# Patient Record
Sex: MALE | Race: WHITE | NOT HISPANIC OR LATINO | URBAN - NONMETROPOLITAN AREA
[De-identification: names, ages, dates, MRNs, and addresses within clinical notes are randomized per-mention and may not be internally consistent; named-entity substitution may affect disease eponyms.]

---

## 2018-01-11 NOTE — PROGRESS NOTES
Assessment    1  Encounter for preventive health examination (V70 0) (Z00 00)    Plan  Atypical chest pain    · (1) COMPREHENSIVE METABOLIC PANEL; Status:Active; Requested for:27Apr2016; Health Maintenance    · Begin a limited exercise program ; Status:Complete;   Done: 27Apr2016   · Brush your teeth 3 times a day and floss at least once a day ; Status:Complete;   Done:  27Apr2016   · Eat a low fat and low cholesterol diet ; Status:Complete;   Done: 27Apr2016   · Some eating tips that can help you lose weight ; Status:Complete;   Done: 27Apr2016   · There are ways to decrease your stress and improve your sense of well-being  We  encourage you to keep active and exercise regularly  Make time to take care of yourself  and participate in activities that you enjoy  Stay connected to friends and family that can  support and comfort you  If at any time you have thoughts of harming yourself or  someone else, contact us immediately ; Status:Active; Requested for:27Apr2016;    · Vitamins can help you get daily requirements that your diet may not be giving you ;  Status:Complete;   Done: 27Apr2016   · We encourage all of our patients to exercise regularly  30 minutes of exercise or physical  activity five or more days a week is recommended for children and adults ;  Status:Complete;   Done: 27Apr2016   · We encourage you to begin to make lifestyle changes to help control your blood  pressure  These may include losing weight, increasing your activity level, limiting salt in  your diet, decreasing alcohol intake, and eating a diet low in fat and rich in fruits  and vegetables ; Status:Complete;   Done: [id number]   · We recommend routine visits to a dentist ; Status:Complete;   Done: [id number]   · We recommend you modify your diet to achieve and maintain a healthy weight  Being  overweight may increase your risk for developing health problems such as diabetes,  heart disease, and cancer   Avoid high fat foods and eat a balanced diet rich  in fruits and vegetables  The combination of a reduced-calorie diet and increased  physical activity is recommended  Please let us know if you would like to  learn more about your nutrition and calorie needs, and additional options including  weight loss programs that can help you achieve your goals ; Status:Complete;   Done:  [id number]   · We recommend you modify your diet to achieve and maintain a healthy weight  Being  underweight may increase your risk of developing health problems from vitamin and  mineral deficiencies  We recommend a balanced diet rich in fruits and vegetables  You  may also consider increasing your calorie intake by eating more frequently or adding  nuts, avocados, and low-fat cheese or milk to your meals  Please let us know  if you would like to learn more about your nutrition and calorie needs, and additional  options to help you achieve your weight goals ; Status:Complete;   Done: [id number]   · Call [phone] if: You have any warning signs of skin cancer ; Status:Complete;    Done: [id number]    Chief Complaint  WELLNESS      History of Present Illness  HM, Adult Male: The patient is being seen for a health maintenance evaluation  The last health maintenance visit was 12 month(s) ago  Social History: Household members include spouse and 2 son(s)  He is   Work status: working full time  The patient has never smoked cigarettes  He reports rare alcohol use  The patient has no concerns about alcohol abuse  He has never used illicit drugs  General Health: The patient's health since the last visit is described as good  He denies vision problems  He denies hearing loss  Immunizations status: up to date  Lifestyle:  He consumes a diverse and healthy diet  He does not have any weight concerns  He exercises regularly  He does not use tobacco  He consumes alcohol  He denies drug use  Reproductive health:  the patient is sexually active   birth control is being practiced  He denies erectile dysfunction  Screening: cancer screening reviewed and current  metabolic screening reviewed and updated  Cardiovascular risk factors: no hypertension, no diabetes, no high LDL cholesterol, no low HDL cholesterol, no stress, no obesity, no tobacco use, no illicit drug use, no sedentary lifestyle and no family history of cardiovascular disease  General health risks: no elevated prostate-specific antigen, no undescended testis, no family history of prostate cancer, no previous colon polyps, no inflammatory bowel disease, no osteoporosis risk factors, no asbestos exposure, no radiation exposure and no occupational exposure to  Safety elements used: bicycle helmet, motorcycle helmet, safe driving habits, smoke detector and carbon monoxide detector, but no seat belt, no sunscreen, no hot water temperature less than 120 degrees F, no bathroom grab bars and no fall prevention measures  Risk findings: no passive smoke exposure, no unsafe sexual behavior, no chemical abuse, no domestic violence, no anxiety symptoms, no depression symptoms, no parenting concerns, no caregiver concerns, no stress management concerns, no anger management concerns, no unsafe driving habits, no multiple driving violations, no history of DUI / DWI, no memory loss, no falls risk, no osteoporosis risk factors, no occupational exposure, no travel to developing areas and no tuberculosis exposure  Review of Systems    Constitutional: No fever or chills, feels well, no tiredness, no recent weight gain or weight loss  Eyes: No complaints of eye pain, no red eyes, no discharge from eyes, no itchy eyes  ENT: no complaints of earache, no hearing loss, no nosebleeds, no nasal discharge, no sore throat, no hoarseness  Cardiovascular: No complaints of slow heart rate, no fast heart rate, no chest pain, no palpitations, no leg claudication, no lower extremity     Respiratory: No complaints of shortness of breath, no wheezing, no cough, no SOB on exertion, no orthopnea or PND  Gastrointestinal: No complaints of abdominal pain, no constipation, no nausea or vomiting, no diarrhea or bloody stools  Genitourinary: No complaints of dysuria, no incontinence, no hesitancy, no nocturia, no genital lesion, no testicular pain  Musculoskeletal: No complaints of arthralgia, no myalgias, no joint swelling or stiffness, no limb pain or swelling  Integumentary: No complaints of skin rash or skin lesions, no itching, no skin wound, no dry skin  Neurological: No compliants of headache, no confusion, no convulsions, no numbness or tingling, no dizziness or fainting, no limb weakness, no difficulty walking  Psychiatric: Is not suicidal, no sleep disturbances, no anxiety or depression, no change in personality, no emotional problems  Endocrine: No complaints of proptosis, no hot flashes, no muscle weakness, no erectile dysfunction, no deepening of the voice, no feelings of weakness  Hematologic/Lymphatic: No complaints of swollen glands, no swollen glands in the neck, does not bleed easily, no easy bruising  Active Problems    1  Atypical chest pain (786 59) (R07 89)   2  Encounter for examination for driving license (I17 4) (A34 4)    Past Medical History    · History of Cough (786 2) (R05)   · History of Denial Of Any Significant Medical History   · History of upper respiratory infection (V12 09) (Z87 09)    Surgical History    · History of Oral Surgery Tooth Extraction    Family History  Mother    · Family history of Diabetes Mellitus (V18 0)  Father    · Family history of Diabetes Mellitus (V18 0)  Brother    · Family history of Diabetes Mellitus (V18 0)    Social History    · Never A Smoker    Allergies    1   No Known Drug Allergies    Vitals   Recorded: 27Apr2016 10:39AM   Heart Rate 61   Systolic 974   Diastolic 76   Height 5 ft 10 in   Weight 156 lb    BMI Calculated 22 38   BSA Calculated 1 88   O2 Saturation 97     Physical Exam    Constitutional   General appearance: No acute distress, well appearing and well nourished  Ears, Nose, Mouth, and Throat   External inspection of ears and nose: Normal     Hearing: Normal     Nasal mucosa, septum, and turbinates: Normal without edema or erythema  Lips, teeth, and gums: Normal, good dentition  Oropharynx: Normal with no erythema, edema, exudate or lesions  Neck   Neck: Supple, symmetric, trachea midline, no masses  Thyroid: Normal, no thyromegaly  Pulmonary   Respiratory effort: No increased work of breathing or signs of respiratory distress  Auscultation of lungs: Clear to auscultation  Cardiovascular   Auscultation of heart: Normal rate and rhythm, normal S1 and S2, no murmurs  Carotid pulses: 2+ bilaterally  Abdominal aorta: Normal     Femoral pulses: 2+ bilaterally  Pedal pulses: 2+ bilaterally  Examination of extremities for edema and/or varicosities: Normal     Chest   Breasts: Normal, no dimpling or skin changes appreciated  Palpation of breasts and axillae: Normal, no masses palpated  Chest: Normal     Abdomen   Abdomen: Non-tender, no masses  Liver and spleen: No hepatomegaly or splenomegaly  Examination for hernias: No hernias appreciated  Anus, perineum, and rectum: Normal sphincter tone, no masses, no prolapse  Stool sample for occult blood: Negative  Genitourinary   Scrotal contents: Normal testes, no masses  Penis: Normal, no lesions  Digital rectal exam of prostate: Normal size, no masses  Lymphatic   Palpation of lymph nodes in neck: No lymphadenopathy  Palpation of lymph nodes in axillae: No lymphadenopathy  Palpation of lymph nodes in groin: No lymphadenopathy  Palpation of lymph nodes in other areas: No lymphadenopathy  Musculoskeletal   Gait and station: Normal     Inspection/palpation of digits and nails: Normal without clubbing or cyanosis      Inspection/palpation of joints, bones, and muscles: Normal     Range of motion: Normal     Stability: Normal     Muscle strength/tone: Normal     Skin   Skin and subcutaneous tissue: Normal without rashes or lesions  Palpation of skin and subcutaneous tissue: Normal turgor  Neurologic   Cranial nerves: Cranial nerves 2-12 intact  Reflexes: 2+ and symmetric  Sensation: No sensory loss  Psychiatric   Judgment and insight: Normal     Orientation to person, place and time: Normal     Recent and remote memory: Intact      Mood and affect: Normal        Signatures   Electronically signed by : Steff Cardenas MD; Apr 27 2016 12:29PM EST                       (Author)

## 2018-04-12 ENCOUNTER — TELEPHONE (OUTPATIENT)
Dept: FAMILY MEDICINE CLINIC | Facility: CLINIC | Age: 43
End: 2018-04-12

## 2018-04-12 DIAGNOSIS — J32.9 SINUSITIS, UNSPECIFIED CHRONICITY, UNSPECIFIED LOCATION: Primary | ICD-10-CM

## 2018-04-12 RX ORDER — AZITHROMYCIN 250 MG/1
TABLET, FILM COATED ORAL
Qty: 6 TABLET | Refills: 0 | Status: SHIPPED | OUTPATIENT
Start: 2018-04-12 | End: 2018-04-16

## 2018-04-12 RX ORDER — METHYLPREDNISOLONE 4 MG/1
TABLET ORAL
Qty: 21 TABLET | Refills: 0 | Status: SHIPPED | OUTPATIENT
Start: 2018-04-12

## 2022-09-13 ENCOUNTER — OFFICE VISIT (OUTPATIENT)
Dept: FAMILY MEDICINE CLINIC | Facility: CLINIC | Age: 47
End: 2022-09-13
Payer: COMMERCIAL

## 2022-09-13 VITALS — WEIGHT: 173 LBS | HEIGHT: 69 IN | BODY MASS INDEX: 25.62 KG/M2

## 2022-09-13 DIAGNOSIS — R63.5 WEIGHT GAIN: ICD-10-CM

## 2022-09-13 DIAGNOSIS — Z13.6 SCREENING FOR ISCHEMIC HEART DISEASE (IHD): ICD-10-CM

## 2022-09-13 DIAGNOSIS — R13.19 ESOPHAGEAL DYSPHAGIA: Primary | ICD-10-CM

## 2022-09-13 DIAGNOSIS — Z13.1 SCREENING FOR DIABETES MELLITUS (DM): ICD-10-CM

## 2022-09-13 PROCEDURE — 99214 OFFICE O/P EST MOD 30 MIN: CPT | Performed by: FAMILY MEDICINE

## 2022-09-13 RX ORDER — OMEPRAZOLE 40 MG/1
40 CAPSULE, DELAYED RELEASE ORAL DAILY
Qty: 90 CAPSULE | Refills: 3 | Status: SHIPPED | OUTPATIENT
Start: 2022-09-13

## 2022-09-13 NOTE — PROGRESS NOTES
Name: Manuelito Kern      : 1975      MRN: [id number]  Encounter Provider: Brice Macias MD  Encounter Date: 2022   Encounter department: [address] [place] [address]    Assessment & Plan     1  Esophageal dysphagia  -     omeprazole (PriLOSEC) 40 MG capsule; Take 1 capsule (40 mg total) by mouth daily  -     Ambulatory Referral to Gastroenterology; Future    2  Screening for ischemic heart disease (IHD)    3  Screening for diabetes mellitus (DM)  -     Lipid panel; Future  -     Comprehensive metabolic panel; Future    4  Weight gain  -     TSH, 3rd generation with Free T4 reflex; Future           Subjective      He's had solid food dysphagia for a few months  He has no weight loss  He has no F/C  He has no pain  The symptoms are intermittent  He has mild reflux symptoms  He denies black or tarry stools  He does not smoke  Review of Systems   All other systems reviewed and are negative  Current Outpatient Medications on File Prior to Visit   Medication Sig    [DISCONTINUED] Methylprednisolone 4 MG TBPK Use as directed on package (Patient not taking: Reported on 2022)       Objective     Ht 5' 9" (1 753 m)   Wt 78 5 kg (173 lb)   BMI 25 55 kg/m²     Physical Exam  Vitals and nursing note reviewed  Constitutional:       Appearance: Normal appearance  He is normal weight  Cardiovascular:      Rate and Rhythm: Normal rate and regular rhythm  Pulses: Normal pulses  Heart sounds: Normal heart sounds  Pulmonary:      Effort: Pulmonary effort is normal       Breath sounds: Normal breath sounds  Abdominal:      General: Abdomen is flat  Bowel sounds are normal       Palpations: Abdomen is soft  Musculoskeletal:         General: Normal range of motion  Cervical back: Normal range of motion and neck supple  Skin:     General: Skin is warm and dry  Capillary Refill: Capillary refill takes less than 2 seconds     Neurological:      General: No focal deficit present  Mental Status: He is alert and oriented to person, place, and time  Mental status is at baseline  Psychiatric:         Mood and Affect: Mood normal          Behavior: Behavior normal          Thought Content: Thought content normal          Judgment: Judgment normal        Caity Phillips MDBMUNRULY Counseling: Body mass index is 25 55 kg/m²  The BMI is above normal  Nutrition recommendations include reducing portion sizes, decreasing overall calorie intake, 3-5 servings of fruits/vegetables daily, reducing fast food intake, consuming healthier snacks, decreasing soda and/or juice intake, moderation in carbohydrate intake, increasing intake of lean protein, reducing intake of saturated fat and trans fat and reducing intake of cholesterol

## 2022-09-23 ENCOUNTER — APPOINTMENT (OUTPATIENT)
Dept: LAB | Facility: CLINIC | Age: 47
End: 2022-09-23
Payer: COMMERCIAL

## 2022-09-23 DIAGNOSIS — R63.5 WEIGHT GAIN: ICD-10-CM

## 2022-09-23 DIAGNOSIS — Z13.1 SCREENING FOR DIABETES MELLITUS (DM): ICD-10-CM

## 2022-09-23 PROCEDURE — 80053 COMPREHEN METABOLIC PANEL: CPT

## 2022-09-23 PROCEDURE — 84443 ASSAY THYROID STIM HORMONE: CPT

## 2022-09-23 PROCEDURE — 80061 LIPID PANEL: CPT

## 2022-09-23 PROCEDURE — 36415 COLL VENOUS BLD VENIPUNCTURE: CPT

## 2022-09-24 LAB
ALBUMIN SERPL BCP-MCNC: 4.1 G/DL (ref 3.5–5)
ALP SERPL-CCNC: 73 U/L (ref 46–116)
ALT SERPL W P-5'-P-CCNC: 50 U/L (ref 12–78)
ANION GAP SERPL CALCULATED.3IONS-SCNC: 5 MMOL/L (ref 4–13)
AST SERPL W P-5'-P-CCNC: 29 U/L (ref 5–45)
BILIRUB SERPL-MCNC: 0.64 MG/DL (ref 0.2–1)
BUN SERPL-MCNC: 14 MG/DL (ref 5–25)
CALCIUM SERPL-MCNC: 9.4 MG/DL (ref 8.3–10.1)
CHLORIDE SERPL-SCNC: 106 MMOL/L (ref 96–108)
CHOLEST SERPL-MCNC: 153 MG/DL
CO2 SERPL-SCNC: 27 MMOL/L (ref 21–32)
CREAT SERPL-MCNC: 1.11 MG/DL (ref 0.6–1.3)
GFR SERPL CREATININE-BSD FRML MDRD: 80 ML/MIN/1.73SQ M
GLUCOSE P FAST SERPL-MCNC: 81 MG/DL (ref 65–99)
HDLC SERPL-MCNC: 35 MG/DL
LDLC SERPL CALC-MCNC: 103 MG/DL (ref 0–100)
NONHDLC SERPL-MCNC: 118 MG/DL
POTASSIUM SERPL-SCNC: 4.6 MMOL/L (ref 3.5–5.3)
PROT SERPL-MCNC: 7.8 G/DL (ref 6.4–8.4)
SODIUM SERPL-SCNC: 138 MMOL/L (ref 135–147)
TRIGL SERPL-MCNC: 76 MG/DL
TSH SERPL DL<=0.05 MIU/L-ACNC: 1.27 UIU/ML (ref 0.45–4.5)

## 2024-06-19 ENCOUNTER — RA CDI HCC (OUTPATIENT)
Dept: OTHER | Facility: HOSPITAL | Age: 49
End: 2024-06-19

## 2024-06-25 ENCOUNTER — CONSULT (OUTPATIENT)
Dept: FAMILY MEDICINE CLINIC | Facility: CLINIC | Age: 49
End: 2024-06-25
Payer: COMMERCIAL

## 2024-06-25 VITALS
DIASTOLIC BLOOD PRESSURE: 76 MMHG | HEART RATE: 61 BPM | BODY MASS INDEX: 25.33 KG/M2 | OXYGEN SATURATION: 98 % | RESPIRATION RATE: 18 BRPM | SYSTOLIC BLOOD PRESSURE: 106 MMHG | WEIGHT: 171 LBS | HEIGHT: 69 IN

## 2024-06-25 DIAGNOSIS — H26.103: Primary | ICD-10-CM

## 2024-06-25 PROCEDURE — 99213 OFFICE O/P EST LOW 20 MIN: CPT

## 2024-06-25 NOTE — PROGRESS NOTES
Presurgical Evaluation    Subjective:      Patient ID: Curtis Nesbitt is a 48 y.o. male.    Chief Complaint   Patient presents with    Pre-op Exam     Patient will have cataract surgery scheduled for July 8th 2024 and July 9th, 2024. Needs forms filled out.        Mr Nesbitt is a 48M w no significant PMH, here for medical clearance for cataract surgery, details per note. Otherwise in his usual state of health, with no other acute complains at this time. Patient has prior history of trauma to the eyes, and has been dealing with worsening vision changes over the past 18 years, now finding that it's restricting his daily living. Patient is medically cleared for proposed procedure, at low cardiovascular event risk.         The following portions of the patient's history were reviewed and updated as appropriate: allergies, current medications, past family history, past medical history, past social history, past surgical history, and problem list.    Procedure date: 6/11,6/25    Surgeon:  Dr Ray Razo   Planned procedure:  Cataract surgery   Diagnosis for procedure:  Bilateral Cataracts    Prior anesthesia: No    CAD History: None   NOTE: Patient should see Cardiology if time available before surgery, and if appropriate.    Pulmonary History: None    Renal history: None    Diabetes History:  None     Neurological History: None     On Immunosuppressant meds/biologics: No      Review of Systems   Constitutional:  Negative for activity change, chills, fatigue and fever.   HENT:  Negative for congestion, ear pain, postnasal drip, rhinorrhea and sore throat.    Eyes:  Positive for visual disturbance. Negative for pain.   Respiratory:  Negative for cough and shortness of breath.    Cardiovascular:  Negative for chest pain and palpitations.   Gastrointestinal:  Negative for abdominal pain, constipation, diarrhea, nausea and vomiting.   Genitourinary:  Negative for dysuria and hematuria.   Musculoskeletal:  Negative for  "arthralgias and back pain.   Skin:  Negative for color change and rash.   Neurological:  Positive for headaches. Negative for dizziness, seizures, syncope, weakness and numbness.   Psychiatric/Behavioral:  Negative for agitation, confusion and sleep disturbance. The patient is not nervous/anxious.    All other systems reviewed and are negative.        Current Outpatient Medications   Medication Sig Dispense Refill    omeprazole (PriLOSEC) 40 MG capsule Take 1 capsule (40 mg total) by mouth daily (Patient taking differently: Take 40 mg by mouth daily As needed) 90 capsule 3     No current facility-administered medications for this visit.       Allergies on file:   Patient has no known allergies.    There is no problem list on file for this patient.       History reviewed. No pertinent past medical history.    History reviewed. No pertinent surgical history.    History reviewed. No pertinent family history.    Social History     Tobacco Use    Smoking status: Never    Smokeless tobacco: Never   Vaping Use    Vaping status: Never Used   Substance Use Topics    Alcohol use: Yes     Comment: Rare    Drug use: Never       Objective:    Vitals:    06/25/24 0849   BP: 106/76   BP Location: Left arm   Patient Position: Sitting   Cuff Size: Standard   Pulse: 61   Resp: 18   SpO2: 98%   Weight: 77.6 kg (171 lb)   Height: 5' 9\" (1.753 m)        Physical Exam  Vitals and nursing note reviewed.   Constitutional:       General: He is not in acute distress.     Appearance: Normal appearance. He is normal weight. He is not ill-appearing.   HENT:      Head: Normocephalic and atraumatic.      Right Ear: External ear normal.      Left Ear: External ear normal.      Nose: Nose normal.      Mouth/Throat:      Mouth: Mucous membranes are moist.      Pharynx: Oropharynx is clear.   Eyes:      Extraocular Movements: Extraocular movements intact.      Conjunctiva/sclera: Conjunctivae normal.   Cardiovascular:      Rate and Rhythm: Normal " rate and regular rhythm.      Pulses: Normal pulses.      Heart sounds: Normal heart sounds. No murmur heard.  Pulmonary:      Effort: Pulmonary effort is normal.      Breath sounds: Normal breath sounds. No wheezing.   Abdominal:      General: Abdomen is flat. Bowel sounds are normal.      Palpations: Abdomen is soft.      Tenderness: There is no abdominal tenderness. There is no guarding.   Musculoskeletal:         General: No swelling or signs of injury. Normal range of motion.      Cervical back: Normal range of motion and neck supple. No rigidity.   Skin:     General: Skin is warm and dry.      Findings: No bruising, erythema or rash.   Neurological:      General: No focal deficit present.      Mental Status: He is alert and oriented to person, place, and time.   Psychiatric:         Mood and Affect: Mood normal.         Behavior: Behavior normal.           Preop labs/testing available and reviewed: yes               EKG no    Echo no    Stress test/cath no    PFT/[place] no    Functional capacity: Singles tennis                       7-12 Mets   Pick the highest level patient can comfortably perform   4 mets or greater for surgery    RCRI  High Risk surgery?         1 Point  CAD History:         1 Point   MI; Positive Stress Test; CP due to Mi;  Nitrate Usage to control Angina; Pathologic Q wave on EKG  CHF Active:         1 Point   Pulm Edema; Paroxysmal Nocturnal Dyspnea;  Bibasilar Rales (crackles);S3; CHF on CXR  Cerebrovascular Disease (TIA or CVA):     1 Point  DM on Insulin:        1 Point  Serum Creat >2.0 mg/dl:       1 Point          Total Points: 0     Scorin: Class I, Very Low Risk (0.4%)     1: Class II, Low risk (0.9%)     2: Class III Moderate (6.6%)     3: Class IV High (>11%)      BONIFACIO Risk:  GFR:        For PCP:  If GFR>60, Hold ACE/ARB/Diuretic on the day of surgery, and NSAIDS 10 days before.    If GFR<45, Consider PRE and POST op Nephrology Consult.    If 46 <GFR> 59 : Has Patient had BONIFACIO  "in last 6 Months? no   If YES: Preop Nephrology consult   If No:  Post Op Nephrology consult.           Assessment/Plan:    Patient is medically optimized (cleared) for the planned procedure.    Further testing/evaluation is not required.    Postop concerns: no    Problem List Items Addressed This Visit    None  Visit Diagnoses       Traumatic cataract of both eyes, unspecified traumatic cataract type    -  Primary             Diagnoses and all orders for this visit:    Traumatic cataract of both eyes, unspecified traumatic cataract type          Pre-Surgery Instructions:   Medication Instructions    omeprazole (PriLOSEC) 40 MG capsule per anesthesia guidelines         NOTE: Please use the above to review important meds for your specialty, the remainder \"per anesthesia Guidelines.\"    NOTE: Please place an Inbasket message for \"SOC\" pool for complicated patients.      "

## 2024-06-25 NOTE — PROGRESS NOTES
"Assessment & Plan     There are no diagnoses linked to this encounter.    Subjective     Patient Id: Curtis Nesbitt is a 48 y.o. male    HPI    Review of Systems    History reviewed. No pertinent past medical history.    History reviewed. No pertinent surgical history.    History reviewed. No pertinent family history.    Social History     Socioeconomic History    Marital status: /Civil Union     Spouse name: None    Number of children: None    Years of education: None    Highest education level: None   Occupational History    None   Tobacco Use    Smoking status: Never    Smokeless tobacco: Never   Vaping Use    Vaping status: Never Used   Substance and Sexual Activity    Alcohol use: Yes     Comment: Rare    Drug use: Never    Sexual activity: Not Currently   Other Topics Concern    None   Social History Narrative    None     Social Determinants of Health     Financial Resource Strain: Not on file   Food Insecurity: Not on file   Transportation Needs: Not on file   Physical Activity: Not on file   Stress: Not on file   Social Connections: Not on file   Intimate Partner Violence: Not on file   Housing Stability: Not on file       No Known Allergies      Current Outpatient Medications:     omeprazole (PriLOSEC) 40 MG capsule, Take 1 capsule (40 mg total) by mouth daily (Patient taking differently: Take 40 mg by mouth daily As needed), Disp: 90 capsule, Rfl: 3    Objective     Vitals:    06/25/24 0849   BP: 106/76   BP Location: Left arm   Patient Position: Sitting   Cuff Size: Standard   Pulse: 61   Resp: 18   SpO2: 98%   Weight: 77.6 kg (171 lb)   Height: 5' 9\" (1.753 m)       Physical Exam    Carolyne Munson MD  Family Medicine  [facility]  "

## 2024-06-25 NOTE — PROGRESS NOTES
Presurgical Evaluation    Subjective:      Patient ID: Curtis Nesbitt is a 48 y.o. male.    Chief Complaint   Patient presents with    Pre-op Exam     Patient will have cataract surgery scheduled for July 8th 2024 and July 9th, 2024. Needs forms filled out.        HPI  6/11 and 25     The following portions of the patient's history were reviewed and updated as appropriate: allergies, current medications, past family history, past medical history, past social history, past surgical history, and problem list.    Procedure date: {DATE:23014}    Surgeon:  ***  Planned procedure:  ***  Diagnosis for procedure:  ***    Prior anesthesia: {[facility] ANESTHSIA :0503698203}    [facility]   NOTE: Patient should see Cardiology if time available before surgery, and if appropriate.    Pulmonary History: {[facility] PULM HX:6826555478}    Renal history: {[facility] Renal Hx:7945260453}    Diabetes History:  {[facility] DIABETES TYPE:8228248221}     Neurological History: {[facility] neuro:4419879700}     On Immunosuppressant meds/biologics: {[facility] IMMUNOSUPRESSANTS:1969216222}      Review of Systems      Current Outpatient Medications   Medication Sig Dispense Refill    omeprazole (PriLOSEC) 40 MG capsule Take 1 capsule (40 mg total) by mouth daily (Patient taking differently: Take 40 mg by mouth daily As needed) 90 capsule 3     No current facility-administered medications for this visit.       Allergies on file:   Patient has no known allergies.    There is no problem list on file for this patient.       History reviewed. No pertinent past medical history.    History reviewed. No pertinent surgical history.    History reviewed. No pertinent family history.    Social History     Tobacco Use    Smoking status: Never    Smokeless tobacco: Never   Vaping Use    Vaping status: Never Used   Substance Use Topics    Alcohol use: Yes     Comment: Rare    Drug use: Never       Objective:    Vitals:    06/25/24 0849   BP: 106/76   BP  "Location: Left arm   Patient Position: Sitting   Cuff Size: Standard   Pulse: 61   Resp: 18   SpO2: 98%   Weight: 77.6 kg (171 lb)   Height: 5' 9\" (1.753 m)        Physical Exam      Preop labs/testing available and reviewed: {YES/NO:}               EKG {YES/NO:}    Echo {YES/NO:}    Stress test/cath {YES/NO:}    PFT/[place] {YES/NO:}    Functional capacity: {sl amb metabolic equivalents:5744936274}   Pick the highest level patient can comfortably perform   4 mets or greater for surgery    RCRI  High Risk surgery?         1 Point  CAD History:         1 Point   MI; Positive Stress Test; CP due to Mi;  Nitrate Usage to control Angina; Pathologic Q wave on EKG  CHF Active:         1 Point   Pulm Edema; Paroxysmal Nocturnal Dyspnea;  Bibasilar Rales (crackles);S3; CHF on CXR  Cerebrovascular Disease (TIA or CVA):     1 Point  DM on Insulin:        1 Point  Serum Creat >2.0 mg/dl:       1 Point          Total Points: {Numbers; 0-6:27731}     Scorin: Class I, Very Low Risk (0.4%)     1: Class II, Low risk (0.9%)     2: Class III Moderate (6.6%)     3: Class IV High (>11%)      BONIFACIO Risk:  GFR:        For PCP:  If GFR>60, Hold ACE/ARB/Diuretic on the day of surgery, and NSAIDS 10 days before.    If GFR<45, Consider PRE and POST op Nephrology Consult.    If 46 <GFR> 59 : Has Patient had BONIFACIO in last 6 Months? {YES/NO:}   If YES: Preop Nephrology consult   If No:  Post Op Nephrology consult.           Assessment/Plan:    Patient {is/is not:39356} medically optimized (cleared) for the planned procedure.    Further testing/evaluation {is/is not:61905} required.    Postop concerns: {YES/NO:}    Problem List Items Addressed This Visit    None       {Assess/PlanSmartLinks:09305}      Pre-Surgery Instructions:   Medication Instructions    omeprazole (PriLOSEC) 40 MG capsule {OR PAT MED INSTRUCTIONS:87296}        NOTE: Please use the above to review important meds for your specialty, the " "remainder \"per anesthesia Guidelines.\"    NOTE: Please place an Inbasket message for \"SOC\" pool for complicated patients.    "

## 2024-10-08 ENCOUNTER — OFFICE VISIT (OUTPATIENT)
Dept: FAMILY MEDICINE CLINIC | Facility: CLINIC | Age: 49
End: 2024-10-08
Payer: COMMERCIAL

## 2024-10-08 VITALS
OXYGEN SATURATION: 99 % | SYSTOLIC BLOOD PRESSURE: 108 MMHG | HEIGHT: 69 IN | RESPIRATION RATE: 18 BRPM | DIASTOLIC BLOOD PRESSURE: 64 MMHG | BODY MASS INDEX: 25.77 KG/M2 | HEART RATE: 65 BPM | WEIGHT: 174 LBS

## 2024-10-08 DIAGNOSIS — R13.19 ESOPHAGEAL DYSPHAGIA: ICD-10-CM

## 2024-10-08 DIAGNOSIS — Z00.00 ANNUAL PHYSICAL EXAM: Primary | ICD-10-CM

## 2024-10-08 PROCEDURE — 99396 PREV VISIT EST AGE 40-64: CPT

## 2024-10-08 RX ORDER — OMEPRAZOLE 40 MG/1
40 CAPSULE, DELAYED RELEASE ORAL DAILY
Qty: 30 CAPSULE | Refills: 2 | Status: SHIPPED | OUTPATIENT
Start: 2024-10-08

## 2024-10-08 NOTE — PATIENT INSTRUCTIONS
"Patient Education     Routine physical for adults   The Basics   Written by the doctors and editors at [facility]   What is a physical? -- A physical is a routine visit, or \"check-up,\" with your doctor. You might also hear it called a \"wellness visit\" or \"preventive visit.\"  During each visit, the doctor will:   Ask about your physical and mental health   Ask about your habits, behaviors, and lifestyle   Do an exam   Give you vaccines if needed   Talk to you about any medicines you take   Give advice about your health   Answer your questions  Getting regular check-ups is an important part of taking care of your health. It can help your doctor find and treat any problems you have. But it's also important for preventing health problems.  A routine physical is different from a \"sick visit.\" A sick visit is when you see a doctor because of a health concern or problem. Since physicals are scheduled ahead of time, you can think about what you want to ask the doctor.  How often should I get a physical? -- It depends on your age and health. In general, for people age 21 years and older:   If you are younger than 50 years, you might be able to get a physical every 3 years.   If you are 50 years or older, your doctor might recommend a physical every year.  If you have an ongoing health condition, like diabetes or high blood pressure, your doctor will probably want to see you more often.  What happens during a physical? -- In general, each visit will include:   Physical exam - The doctor or nurse will check your height, weight, heart rate, and blood pressure. They will also look at your eyes and ears. They will ask about how you are feeling and whether you have any symptoms that bother you.   Medicines - It's a good idea to bring a list of all the medicines you take to each doctor visit. Your doctor will talk to you about your medicines and answer any questions. Tell them if you are having any side effects that bother you. You " "should also tell them if you are having trouble paying for any of your medicines.   Habits and behaviors - This includes:   Your diet   Your exercise habits   Whether you smoke, drink alcohol, or use drugs   Whether you are sexually active   Whether you feel safe at home  Your doctor will talk to you about things you can do to improve your health and lower your risk of health problems. They will also offer help and support. For example, if you want to quit smoking, they can give you advice and might prescribe medicines. If you want to improve your diet or get more physical activity, they can help you with this, too.   Lab tests, if needed - The tests you get will depend on your age and situation. For example, your doctor might want to check your:   Cholesterol   Blood sugar   Iron level   Vaccines - The recommended vaccines will depend on your age, health, and what vaccines you already had. Vaccines are very important because they can prevent certain serious or deadly infections.   Discussion of screening - \"Screening\" means checking for diseases or other health problems before they cause symptoms. Your doctor can recommend screening based on your age, risk, and preferences. This might include tests to check for:   Cancer, such as breast, prostate, cervical, ovarian, colorectal, prostate, lung, or skin cancer   Sexually transmitted infections, such as chlamydia and gonorrhea   Mental health conditions like depression and anxiety  Your doctor will talk to you about the different types of screening tests. They can help you decide which screenings to have. They can also explain what the results might mean.   Answering questions - The physical is a good time to ask the doctor or nurse questions about your health. If needed, they can refer you to other doctors or specialists, too.  Adults older than 65 years often need other care, too. As you get older, your doctor will talk to you about:   How to prevent falling at " home   Hearing or vision tests   Memory testing   How to take your medicines safely   Making sure that you have the help and support you need at home  All topics are updated as new evidence becomes available and our peer review process is complete.  This topic retrieved from [employer] on: May 02, 2024.  Topic 619499 Version 1.0  Release: 32.4.3 - C32.122  © 2024 UpToDate, Inc. and/or its affiliates. All rights reserved.  Consumer Information Use and Disclaimer   Disclaimer: This generalized information is a limited summary of diagnosis, treatment, and/or medication information. It is not meant to be comprehensive and should be used as a tool to help the user understand and/or assess potential diagnostic and treatment options. It does NOT include all information about conditions, treatments, medications, side effects, or risks that may apply to a specific patient. It is not intended to be medical advice or a substitute for the medical advice, diagnosis, or treatment of a health care provider based on the health care provider's examination and assessment of a patient's specific and unique circumstances. Patients must speak with a health care provider for complete information about their health, medical questions, and treatment options, including any risks or benefits regarding use of medications. This information does not endorse any treatments or medications as safe, effective, or approved for treating a specific patient. UpToDate, Inc. and its affiliates disclaim any warranty or liability relating to this information or the use thereof.The use of this information is governed by the Terms of Use, available at https://www.woltersPlayRavenuwer.com/en/know/clinical-effectiveness-terms. 2024© UpToDate, Inc. and its affiliates and/or licensors. All rights reserved.  Copyright   © 2024 UpToDate, Inc. and/or its affiliates. All rights reserved.

## 2024-10-08 NOTE — PROGRESS NOTES
Adult Annual Physical  Name: Curtis Nesbitt      : 1975      MRN: [id number]  Encounter Provider: Carolyne Munson MD  Encounter Date: 10/8/2024   Encounter department: [facility]    Assessment & Plan  Annual physical exam         Esophageal dysphagia    Orders:    omeprazole (PriLOSEC) 40 MG capsule; Take 1 capsule (40 mg total) by mouth daily    BMI 25.0-25.9,adult         Immunizations and preventive care screenings were discussed with patient today. Appropriate education was printed on patient's after visit summary.    Discussed risks and benefits of prostate cancer screening. We discussed the controversial history of PSA screening for prostate cancer in the [place] as well as the risk of over detection and over treatment of prostate cancer by way of PSA screening.  The patient understands that PSA blood testing is an imperfect way to screen for prostate cancer and that elevated PSA levels in the blood may also be caused by infection, inflammation, prostatic trauma or manipulation, urological procedures, or by benign prostatic enlargement.    The role of the digital rectal examination in prostate cancer screening was also discussed and I discussed with him that there is large interobserver variability in the findings of digital rectal examination.    Counseling:  Dental Health: discussed importance of regular tooth brushing, flossing, and dental visits.  Exercise: the importance of regular exercise/physical activity was discussed. Recommend exercise 3-5 times per week for at least 30 minutes.          History of Present Illness     Adult Annual Physical:  Patient presents for annual physical.     Diet and Physical Activity:  - Diet/Nutrition: poor diet and limited junk food.  - Exercise: no formal exercise, walking and 3-4 times a week on average.    General Health:  - Sleep: 7-8 hours of sleep on average.  - Hearing: normal hearing bilateral ears.  - Vision:  "no vision problems.  - Dental: regular dental visits.     Health:    - Urinary symptoms: none.     Review of Systems   Constitutional:  Negative for activity change, chills and fever.   HENT:  Negative for congestion, ear pain, postnasal drip, rhinorrhea and sore throat.    Eyes:  Negative for pain and visual disturbance.   Respiratory:  Negative for cough, shortness of breath and wheezing.    Cardiovascular:  Negative for chest pain and palpitations.   Gastrointestinal:  Negative for abdominal pain, constipation, diarrhea, nausea and vomiting.   Genitourinary:  Negative for dysuria, frequency, hematuria and urgency.   Musculoskeletal:  Negative for arthralgias and back pain.   Skin:  Negative for color change and rash.   Neurological:  Negative for dizziness, seizures, syncope and headaches.   Psychiatric/Behavioral:  Negative for agitation, confusion and sleep disturbance. The patient is not nervous/anxious.    All other systems reviewed and are negative.    Current Outpatient Medications on File Prior to Visit   Medication Sig Dispense Refill    [DISCONTINUED] omeprazole (PriLOSEC) 40 MG capsule Take 1 capsule (40 mg total) by mouth daily (Patient taking differently: Take 40 mg by mouth daily As needed) 90 capsule 3     No current facility-administered medications on file prior to visit.      Social History     Tobacco Use    Smoking status: Never     Passive exposure: Never    Smokeless tobacco: Never   Vaping Use    Vaping status: Never Used   Substance and Sexual Activity    Alcohol use: Not Currently     Comment: Rare    Drug use: Never    Sexual activity: Not Currently       Objective     /64 (BP Location: Right arm, Patient Position: Sitting, Cuff Size: Standard)   Pulse 65   Resp 18   Ht 5' 9\" (1.753 m)   Wt 78.9 kg (174 lb)   SpO2 99%   BMI 25.70 kg/m²     Physical Exam  Vitals and nursing note reviewed.   Constitutional:       General: He is not in acute distress.     Appearance: Normal " appearance. He is well-developed. He is not ill-appearing.   HENT:      Head: Normocephalic and atraumatic.      Right Ear: External ear normal.      Left Ear: External ear normal.      Nose: Nose normal.      Mouth/Throat:      Mouth: Mucous membranes are moist.      Pharynx: Oropharynx is clear.   Eyes:      Extraocular Movements: Extraocular movements intact.      Conjunctiva/sclera: Conjunctivae normal.   Cardiovascular:      Rate and Rhythm: Normal rate and regular rhythm.      Pulses: Normal pulses.      Heart sounds: Normal heart sounds. No murmur heard.  Pulmonary:      Effort: Pulmonary effort is normal. No respiratory distress.      Breath sounds: Normal breath sounds.   Abdominal:      General: Bowel sounds are normal.      Palpations: Abdomen is soft.      Tenderness: There is no abdominal tenderness.   Musculoskeletal:         General: No swelling. Normal range of motion.      Cervical back: Normal range of motion and neck supple.   Skin:     General: Skin is warm and dry.      Capillary Refill: Capillary refill takes less than 2 seconds.   Neurological:      General: No focal deficit present.      Mental Status: He is alert and oriented to person, place, and time.   Psychiatric:         Mood and Affect: Mood normal.         Behavior: Behavior normal.